# Patient Record
Sex: FEMALE | Race: BLACK OR AFRICAN AMERICAN | Employment: FULL TIME | URBAN - METROPOLITAN AREA
[De-identification: names, ages, dates, MRNs, and addresses within clinical notes are randomized per-mention and may not be internally consistent; named-entity substitution may affect disease eponyms.]

---

## 2019-02-06 NOTE — TELEPHONE ENCOUNTER
Refill was done in [facility] 08/14  But last seen in [facility] 08/16/2017

## 2019-02-07 NOTE — TELEPHONE ENCOUNTER
Tried to [facility] but phone number has been disconnected.

## 2019-02-08 RX ORDER — AMLODIPINE BESYLATE 5 MG/1
5 TABLET ORAL DAILY
Qty: 30 TAB | Refills: 5 | OUTPATIENT
Start: 2019-02-08 | End: 2019-03-10

## 2019-03-20 RX ORDER — LISINOPRIL 40 MG/1
40 TABLET ORAL DAILY
Qty: 30 TAB | Refills: 0 | OUTPATIENT
Start: 2019-03-20

## 2019-03-20 RX ORDER — AMLODIPINE BESYLATE 5 MG/1
5 TABLET ORAL DAILY
Qty: 30 TAB | Refills: 0 | OUTPATIENT
Start: 2019-03-20

## 2020-11-11 ENCOUNTER — TRANSCRIBE ORDER (OUTPATIENT)
Dept: SCHEDULING | Age: 50
End: 2020-11-11

## 2020-11-11 DIAGNOSIS — R00.2 PALPITATION: ICD-10-CM

## 2020-11-11 DIAGNOSIS — R07.9 CHEST PAIN: Primary | ICD-10-CM

## 2020-11-18 ENCOUNTER — TRANSCRIBE ORDER (OUTPATIENT)
Dept: SCHEDULING | Age: 50
End: 2020-11-18

## 2020-11-18 DIAGNOSIS — R07.9 CHEST PAIN: Primary | ICD-10-CM

## 2020-11-18 DIAGNOSIS — R00.2 PALPITATION: ICD-10-CM

## 2020-11-19 ENCOUNTER — APPOINTMENT (OUTPATIENT)
Dept: NON INVASIVE DIAGNOSTICS | Age: 50
End: 2020-11-19
Attending: INTERNAL MEDICINE
Payer: SUBSIDIZED

## 2020-11-19 ENCOUNTER — HOSPITAL ENCOUNTER (OUTPATIENT)
Dept: NON INVASIVE DIAGNOSTICS | Age: 50
Discharge: HOME | End: 2020-11-19
Attending: INTERNAL MEDICINE
Payer: SUBSIDIZED

## 2020-11-19 ENCOUNTER — APPOINTMENT (OUTPATIENT)
Dept: NUCLEAR MEDICINE | Age: 50
End: 2020-11-19
Attending: INTERNAL MEDICINE
Payer: SUBSIDIZED

## 2020-11-19 VITALS
SYSTOLIC BLOOD PRESSURE: 137 MMHG | HEIGHT: 66 IN | DIASTOLIC BLOOD PRESSURE: 88 MMHG | BODY MASS INDEX: 32.78 KG/M2 | WEIGHT: 204 LBS

## 2020-11-19 DIAGNOSIS — R07.9 CHEST PAIN: ICD-10-CM

## 2020-11-19 DIAGNOSIS — R00.2 PALPITATION: ICD-10-CM

## 2020-11-19 LAB
ECHO AO ROOT DIAM: 2.02 CM
ECHO AV AREA PLAN: 2.96 CM2
ECHO LA AREA 4C: 18.43 CM2
ECHO LA MAJOR AXIS: 3.08 CM
ECHO LA VOL 4C: 42.88 ML (ref 22–52)
ECHO LV EDV A4C: 88.97 ML
ECHO LV EJECTION FRACTION A4C: 67 PERCENT
ECHO LV ESV A4C: 29.42 ML
ECHO LV INTERNAL DIMENSION DIASTOLIC: 5.16 CM (ref 3.9–5.3)
ECHO LV INTERNAL DIMENSION SYSTOLIC: 2.94 CM
ECHO LV IVSD: 0.69 CM (ref 0.6–0.9)
ECHO LV MASS 2D: 129.9 G (ref 67–162)
ECHO LV POSTERIOR WALL DIASTOLIC: 0.79 CM (ref 0.6–0.9)
ECHO LVOT DIAM: 2.05 CM
ECHO LVOT PEAK GRADIENT: 3.73 MMHG
ECHO LVOT PEAK VELOCITY: 96.55 CM/S
ECHO MV A VELOCITY: 41.27 CM/S
ECHO MV AREA PHT: 6.19 CM2
ECHO MV AREA PLAN: 5.52 CM2
ECHO MV E DECELERATION TIME (DT): 122.47 MS
ECHO MV E VELOCITY: 37.32 CM/S
ECHO MV E/A RATIO: 0.9
ECHO MV PRESSURE HALF TIME (PHT): 35.52 MS
ECHO PV PEAK INSTANTANEOUS GRADIENT SYSTOLIC: 3.7 MMHG
ECHO PV REGURGITANT MAX VELOCITY: 96.12 CM/S
ECHO RA AREA 4C: 14.95 CM2
ECHO TV REGURGITANT MAX VELOCITY: 123.99 CM/S
STRESS ANGINA INDEX: 0
STRESS BASELINE DIAS BP: 88 MMHG
STRESS BASELINE HR: 83 BPM
STRESS BASELINE SYS BP: 137 MMHG
STRESS ESTIMATED WORKLOAD: 7 METS
STRESS EXERCISE DUR MIN: NORMAL
STRESS O2 SAT REST: 100 %
STRESS PEAK DIAS BP: 97 MMHG
STRESS PEAK SYS BP: 218 MMHG
STRESS PERCENT HR ACHIEVED: 97 %
STRESS POST PEAK HR: 164 BPM
STRESS RATE PRESSURE PRODUCT: NORMAL BPM*MMHG
STRESS SR DUKE TREADMILL SCORE: 0
STRESS ST DEPRESSION: 0 MM
STRESS ST ELEVATION: 0 MM
STRESS STAGE 1 BP: NORMAL MMHG
STRESS STAGE 1 COMMENTS: NORMAL
STRESS STAGE 1 DURATION: NORMAL MIN:SEC
STRESS STAGE 1 HR: 94 BPM
STRESS STAGE 2 BP: NORMAL MMHG
STRESS STAGE 2 COMMENTS: NORMAL
STRESS STAGE 2 DURATION: NORMAL MIN:SEC
STRESS STAGE 2 HR: 131 BPM
STRESS STAGE 3 COMMENTS: NORMAL
STRESS STAGE 3 DURATION: NORMAL MIN:SEC
STRESS STAGE 3 HR: 118 BPM
STRESS STAGE 4 COMMENTS: NORMAL
STRESS STAGE 4 DURATION: NORMAL MIN:SEC
STRESS STAGE 4 HR: 100 BPM
STRESS STAGE 5 BP: NORMAL MMHG
STRESS STAGE 5 COMMENTS: NORMAL
STRESS STAGE 5 DURATION: NORMAL MIN:SEC
STRESS STAGE 5 HR: 106 BPM
STRESS TARGET HR: 169 BPM

## 2020-11-19 PROCEDURE — 93271 ECG/MONITORING AND ANALYSIS: CPT

## 2020-11-19 PROCEDURE — 93306 TTE W/DOPPLER COMPLETE: CPT

## 2020-11-19 PROCEDURE — 93017 CV STRESS TEST TRACING ONLY: CPT

## 2020-11-19 NOTE — PROGRESS NOTES
14 day event monitor  placed after instructions to the pt, noted to return to [employer] per ups after 14 days, if life threatening  Event occurs call 911  ,

## 2022-03-29 ENCOUNTER — OFFICE VISIT (OUTPATIENT)
Dept: PRIMARY CARE CLINIC | Age: 52
End: 2022-03-29
Payer: COMMERCIAL

## 2022-03-29 ENCOUNTER — TELEPHONE (OUTPATIENT)
Dept: PRIMARY CARE CLINIC | Age: 52
End: 2022-03-29

## 2022-03-29 VITALS
HEIGHT: 66 IN | BODY MASS INDEX: 33.68 KG/M2 | DIASTOLIC BLOOD PRESSURE: 82 MMHG | SYSTOLIC BLOOD PRESSURE: 133 MMHG | HEART RATE: 81 BPM | TEMPERATURE: 98.7 F | RESPIRATION RATE: 16 BRPM | OXYGEN SATURATION: 99 % | WEIGHT: 209.6 LBS

## 2022-03-29 DIAGNOSIS — Z12.4 ENCOUNTER FOR SCREENING FOR CERVICAL CANCER: Primary | ICD-10-CM

## 2022-03-29 DIAGNOSIS — M54.2 NECK PAIN: ICD-10-CM

## 2022-03-29 DIAGNOSIS — I10 PRIMARY HYPERTENSION: ICD-10-CM

## 2022-03-29 DIAGNOSIS — Z12.31 ENCOUNTER FOR SCREENING MAMMOGRAM FOR MALIGNANT NEOPLASM OF BREAST: ICD-10-CM

## 2022-03-29 DIAGNOSIS — R07.89 CHEST DISCOMFORT: ICD-10-CM

## 2022-03-29 DIAGNOSIS — Z12.11 SCREEN FOR COLON CANCER: ICD-10-CM

## 2022-03-29 DIAGNOSIS — Z23 ENCOUNTER FOR IMMUNIZATION: ICD-10-CM

## 2022-03-29 PROCEDURE — 99205 OFFICE O/P NEW HI 60 MIN: CPT

## 2022-03-29 PROCEDURE — 90471 IMMUNIZATION ADMIN: CPT

## 2022-03-29 PROCEDURE — 90750 HZV VACC RECOMBINANT IM: CPT

## 2022-03-29 RX ORDER — ASPIRIN 81 MG/1
81 TABLET ORAL DAILY
COMMUNITY

## 2022-03-29 RX ORDER — LISINOPRIL 40 MG/1
40 TABLET ORAL DAILY
Qty: 30 TABLET | Refills: 0 | Status: SHIPPED | OUTPATIENT
Start: 2022-03-29 | End: 2022-04-28

## 2022-03-29 RX ORDER — KETOROLAC TROMETHAMINE 10 MG/1
10 TABLET, FILM COATED ORAL
Qty: 16 TABLET | Refills: 0 | Status: SHIPPED | OUTPATIENT
Start: 2022-03-29 | End: 2022-04-02

## 2022-03-29 RX ORDER — AMLODIPINE BESYLATE 5 MG/1
5 TABLET ORAL DAILY
Qty: 30 TABLET | Refills: 0 | Status: SHIPPED | OUTPATIENT
Start: 2022-03-29 | End: 2022-04-23

## 2022-03-29 NOTE — LETTER
NOTIFICATION RETURN TO WORK / SCHOOL    3/29/2022 12:12 PM    Ms. [address] [place] [address]      To Whom It May Concern:    Marcio Snowden is currently under the care of Shubham Cueva. She will return to work/school on: 4/4/2022    If there are questions or concerns please have the patient contact our office.         Sincerely,      Truman Lemon NP

## 2022-03-29 NOTE — PROGRESS NOTES
Chief Complaint   Patient presents with    Establish Care     establish care, routine labs    Medication Refill     amlodipine, lisinopril     Visit Vitals  /82 (BP 1 Location: Left upper arm, BP Patient Position: Sitting, BP Cuff Size: Adult)   Pulse 81   Temp 98.7 °F (37.1 °C) (Oral)   Resp 16   Ht 5' 6\" (1.676 m)   Wt 209 lb 9.6 oz (95.1 kg)   SpO2 99%   BMI 33.83 kg/m²   1. \"Have you been to the ER, urgent care clinic since your last visit? Hospitalized since your last visit? \"  no    2. \"Have you seen or consulted any other health care providers outside of the [address] [place] [address] since your last visit? \"  no    3. For patients aged 39-70: Has the patient had a colonoscopy / FIT/ Cologuard?  no      If the patient is female:    4. For patients aged 41-77: Has the patient had a mammogram within the past 2 years?  no      5. For patients aged 21-65: Has the patient had a pap smear?   no

## 2022-03-29 NOTE — PATIENT INSTRUCTIONS
Stopping Smoking: Care Instructions  Your Care Instructions     Cigarette smokers crave the nicotine in cigarettes. Giving it up is much harder than simply changing a habit. Your body has to stop craving the nicotine. It is hard to quit, but you can do it. There are many tools that people use to quit smoking. You may find that combining tools works best for you. There are several steps to quitting. First you get ready to quit. Then you get support to help you. After that, you learn new skills and behaviors to become a nonsmoker. For many people, a necessary step is getting and using medicine. Your doctor will help you set up the plan that best meets your needs. You may want to attend a smoking cessation program to help you quit smoking. When you choose a program, look for one that has proven success. Ask your doctor for ideas. You will greatly increase your chances of success if you take medicine as well as get counseling or join a cessation program.  Some of the changes you feel when you first quit tobacco are uncomfortable. Your body will miss the nicotine at first, and you may feel short-tempered and grumpy. You may have trouble sleeping or concentrating. Medicine can help you deal with these symptoms. You may struggle with changing your smoking habits and rituals. The last step is the tricky one: Be prepared for the smoking urge to continue for a time. This is a lot to deal with, but keep at it. You will feel better. Follow-up care is a key part of your treatment and safety. Be sure to make and go to all appointments, and call your doctor if you are having problems. It's also a good idea to know your test results and keep a list of the medicines you take. How can you care for yourself at home? · Ask your family, friends, and coworkers for support. You have a better chance of quitting if you have help and support.   · Join a support group, such as Nicotine Anonymous, for people who are trying to quit smoking. · Consider signing up for a smoking cessation program, such as the American Lung Association's Freedom from Smoking program.  · Get text messaging support. Go to the website at www.smokefree. gov to sign up for the [facility] program.  · Set a quit date. Pick your date carefully so that it is not right in the middle of a big deadline or stressful time. Once you quit, do not even take a puff. Get rid of all ashtrays and lighters after your last cigarette. Clean your house and your clothes so that they do not smell of smoke. · Learn how to be a nonsmoker. Think about ways you can avoid those things that make you reach for a cigarette. ? Avoid situations that put you at greatest risk for smoking. For some people, it is hard to have a drink with friends without smoking. For others, they might skip a coffee break with coworkers who smoke. ? Change your daily routine. Take a different route to work or eat a meal in a different place. · Cut down on stress. Calm yourself or release tension by doing an activity you enjoy, such as reading a book, taking a hot bath, or gardening. · Talk to your doctor or pharmacist about nicotine replacement therapy, which replaces the nicotine in your body. You still get nicotine but you do not use tobacco. Nicotine replacement products help you slowly reduce the amount of nicotine you need. These products come in several forms, many of them available over-the-counter:  ? Nicotine patches  ? Nicotine gum and lozenges  ? Nicotine inhaler  · Ask your doctor about bupropion (Wellbutrin) or varenicline (Chantix), which are prescription medicines. They do not contain nicotine. They help you by reducing withdrawal symptoms, such as stress and anxiety. · Some people find hypnosis, acupuncture, and massage helpful for ending the smoking habit. · Eat a healthy diet and get regular exercise. Having healthy habits will help your body move past its craving for nicotine.   · Be prepared to keep trying. Most people are not successful the first few times they try to quit. Do not get mad at yourself if you smoke again. Make a list of things you learned and think about when you want to try again, such as next week, next month, or next year. Where can you learn more? Go to http://www.gray.com/  Enter [id number] in the search box to learn more about \"Stopping Smoking: Care Instructions. \"  Current as of: October 28, 2021               Content Version: 13.2  © 9979-7374 [employer]wise, [employer]. Care instructions adapted under license by [employer] (which disclaims liability or warranty for this information). If you have questions about a medical condition or this instruction, always ask your healthcare professional. [address] any warranty or liability for your use of this information.

## 2022-03-29 NOTE — PROGRESS NOTES
HPI     Chief Complaint   Patient presents with    Establish Care     establish care, routine labs    Medication Refill     amlodipine, lisinopril        HPI:  Nic Tyson is a 47 y.o. female who presents to the office today to establish care with a new provider. Neck pain: Patient complaining of neck pain left side radiates down to the scapula. Denies trauma, states worse when she is in a supine position. No history of trauma. No change in mobility of left arm. Chest discomfort: Patient endorses a 2-month period of dull chest pressure substernal without radiation. States that discomfort goes away with time and is more noticeable when lying flat. Negative diaphoresis, negative dyspnea, negative nausea vomiting. Patient has not been evaluated for the same. Hypertension: Patients hypertension is well controlled on regimen of amlodipine and lisinopril. Denies headaches, blurred vision or dizziness. No Known Allergies    Current Outpatient Medications   Medication Sig    aspirin delayed-release 81 mg tablet Take 81 mg by mouth daily.  amLODIPine (NORVASC) 5 mg tablet Take 1 Tablet by mouth daily.  lisinopriL (PRINIVIL, ZESTRIL) 40 mg tablet Take 1 Tablet by mouth daily.  ketorolac (TORADOL) 10 mg tablet Take 1 Tablet by mouth every six (6) hours as needed for Pain for up to 4 days. No current facility-administered medications for this visit. Review of Systems   Constitutional: Negative for malaise/fatigue and weight loss. Eyes: Negative for blurred vision and double vision. Respiratory: Negative for cough and shortness of breath. Cardiovascular: Positive for chest pain (Substernal chest pressure noted over 2 months. Not present today. ). Negative for palpitations, orthopnea, claudication and leg swelling. Gastrointestinal: Negative for heartburn and nausea. Musculoskeletal: Negative for joint pain and myalgias. Skin: Negative for itching and rash. Neurological: Negative for dizziness, tingling, loss of consciousness, weakness and headaches. Endo/Heme/Allergies: Does not bruise/bleed easily. Psychiatric/Behavioral: Negative for depression. The patient is not nervous/anxious. All other systems reviewed and are negative. Reviewed PmHx, FmHx, SocHx as well as meds and allergies, updated and dated in the chart. Objective     Visit Vitals  /82 (BP 1 Location: Left upper arm, BP Patient Position: Sitting, BP Cuff Size: Adult)   Pulse 81   Temp 98.7 °F (37.1 °C) (Oral)   Resp 16   Ht 5' 6\" (1.676 m)   Wt 209 lb 9.6 oz (95.1 kg)   SpO2 99%   BMI 33.83 kg/m²     Physical Exam  Vitals and nursing note reviewed. Constitutional:       Appearance: Normal appearance. She is normal weight. HENT:      Head: Normocephalic. Eyes:      Extraocular Movements: Extraocular movements intact. Conjunctiva/sclera: Conjunctivae normal.      Pupils: Pupils are equal, round, and reactive to light. Cardiovascular:      Rate and Rhythm: Normal rate and regular rhythm. Pulses: Normal pulses. Heart sounds: Normal heart sounds. Pulmonary:      Effort: Pulmonary effort is normal.      Breath sounds: Normal breath sounds. Musculoskeletal:         General: Normal range of motion. Cervical back: Normal range of motion and neck supple. Skin:     General: Skin is warm and dry. Neurological:      General: No focal deficit present. Mental Status: She is alert and oriented to person, place, and time. Psychiatric:         Mood and Affect: Mood normal.             Assessment and Plan     Diagnoses and all orders for this visit:    1. Encounter for screening for cervical cancer  Assessment & Plan:   asymptomatic, changes made today: Zoster vac administered in office today.     Orders:  -     REFERRAL TO GYNECOLOGY    2. Screen for colon cancer  Assessment & Plan:   asymptomatic, changes made today: Zoster vac administered in office today.    Orders:  -     COLOGUARD TEST (FECAL DNA COLORECTAL CANCER SCREENING)    3. Encounter for immunization  Assessment & Plan:   asymptomatic, changes made today: Zoster vac administered in office today. Orders:  -     ZOSTER VACC RECOMBINANT ADJUVANTED    4. Primary hypertension  Assessment & Plan:   well controlled, continue current medications, continue current treatment plan    Orders:  -     amLODIPine (NORVASC) 5 mg tablet; Take 1 Tablet by mouth daily. -     lisinopriL (PRINIVIL, ZESTRIL) 40 mg tablet; Take 1 Tablet by mouth daily. 5. Chest discomfort  Assessment & Plan:   unclear control, changes made today: Twelve-lead EKG performed. Orders:  -     EKG, 12 LEAD, INITIAL; Future  Resulted:  EKG NSR    6. Encounter for screening mammogram for malignant neoplasm of breast  Assessment & Plan:   asymptomatic, changes made today: Zoster vac administered in office today. Orders:  -     REFERRAL TO GYNECOLOGY    7. Neck pain  Assessment & Plan:   borderline controlled, continue current medications patient to discontinue aspirin while taking Toradol. Plan includes to follow-up in 2 months. Patient instructed to call back in 1 week if pain has not improved. Orders:  -     ketorolac (TORADOL) 10 mg tablet; Take 1 Tablet by mouth every six (6) hours as needed for Pain for up to 4 days. Medication Side Effects and Warnings were discussed with patient. Patient Labs were reviewed and or requested. Patient Past Records were reviewed and or requested. Follow-up and Dispositions    · Return in about 2 months (around 5/29/2022) for fasting labs, follow up, Lipids. On this date 3/29/2022 I have spent 60 minutes reviewing previous notes, test results and face to face with the patient discussing the diagnosis and plan of care as well as documenting on the day of the visit. Please note that this dictation was completed with [email or web address], the [employer] voice recognition software.   Quite often unanticipated grammatical, syntax, homophones, and other interpretive errors are inadvertently transcribed by the computer software. Please disregard these errors. Please excuse any errors that have escaped final proofreading. Thank you. I have discussed the diagnosis with the patient and the intended plan as seen in the above orders. The patient has received an after-visit summary and questions were answered concerning future plans. I have discussed medication side effects and warnings with the patient as well. Roel Ruiz, [facility]  [address]

## 2022-03-29 NOTE — ASSESSMENT & PLAN NOTE
borderline controlled, continue current medications patient to discontinue aspirin while taking Toradol. Plan includes to follow-up in 2 months. Patient instructed to call back in 1 week if pain has not improved.

## 2022-03-30 ENCOUNTER — TELEPHONE (OUTPATIENT)
Dept: PRIMARY CARE CLINIC | Age: 52
End: 2022-03-30

## 2022-03-30 DIAGNOSIS — R07.89 CHEST DISCOMFORT: ICD-10-CM

## 2022-03-30 PROCEDURE — 93010 ELECTROCARDIOGRAM REPORT: CPT

## 2022-03-30 NOTE — TELEPHONE ENCOUNTER
Spoke with Pt. And advised her to take the 5mg every day and monitor her BP for 14 days. Call the office to schedule appt. If BP is out of range. Pt. Voiced understanding.

## 2022-03-30 NOTE — TELEPHONE ENCOUNTER
Patient stated that her amlodipine was only prescribed for 5 mg and she takes 10 mg daily. Could you please update this prescription?

## 2022-04-20 DIAGNOSIS — I10 PRIMARY HYPERTENSION: ICD-10-CM

## 2022-04-23 RX ORDER — AMLODIPINE BESYLATE 5 MG/1
TABLET ORAL
Qty: 30 TABLET | Refills: 0 | Status: SHIPPED | OUTPATIENT
Start: 2022-04-23 | End: 2022-04-28

## 2022-04-26 DIAGNOSIS — I10 PRIMARY HYPERTENSION: ICD-10-CM

## 2022-04-28 RX ORDER — LISINOPRIL 40 MG/1
TABLET ORAL
Qty: 30 TABLET | Refills: 1 | Status: SHIPPED | OUTPATIENT
Start: 2022-04-28 | End: 2022-06-26

## 2022-04-28 RX ORDER — AMLODIPINE BESYLATE 5 MG/1
TABLET ORAL
Qty: 30 TABLET | Refills: 1 | Status: SHIPPED | OUTPATIENT
Start: 2022-04-28 | End: 2022-05-31

## 2022-05-31 ENCOUNTER — OFFICE VISIT (OUTPATIENT)
Dept: PRIMARY CARE CLINIC | Age: 52
End: 2022-05-31
Payer: COMMERCIAL

## 2022-05-31 VITALS
TEMPERATURE: 98.3 F | SYSTOLIC BLOOD PRESSURE: 120 MMHG | HEART RATE: 82 BPM | WEIGHT: 205.2 LBS | HEIGHT: 66 IN | OXYGEN SATURATION: 97 % | BODY MASS INDEX: 32.98 KG/M2 | RESPIRATION RATE: 16 BRPM | DIASTOLIC BLOOD PRESSURE: 74 MMHG

## 2022-05-31 DIAGNOSIS — Z00.00 ROUTINE PHYSICAL EXAMINATION: ICD-10-CM

## 2022-05-31 DIAGNOSIS — E78.2 MIXED HYPERLIPIDEMIA: ICD-10-CM

## 2022-05-31 DIAGNOSIS — R07.89 CHEST DISCOMFORT: Primary | ICD-10-CM

## 2022-05-31 DIAGNOSIS — I10 PRIMARY HYPERTENSION: ICD-10-CM

## 2022-05-31 PROBLEM — Z13.220 ENCOUNTER FOR LIPID SCREENING FOR CARDIOVASCULAR DISEASE: Status: ACTIVE | Noted: 2022-05-31

## 2022-05-31 PROBLEM — Z13.220 ENCOUNTER FOR LIPID SCREENING FOR CARDIOVASCULAR DISEASE: Status: RESOLVED | Noted: 2022-05-31 | Resolved: 2022-05-31

## 2022-05-31 PROBLEM — Z13.6 ENCOUNTER FOR LIPID SCREENING FOR CARDIOVASCULAR DISEASE: Status: RESOLVED | Noted: 2022-05-31 | Resolved: 2022-05-31

## 2022-05-31 PROBLEM — Z13.6 ENCOUNTER FOR LIPID SCREENING FOR CARDIOVASCULAR DISEASE: Status: ACTIVE | Noted: 2022-05-31

## 2022-05-31 PROCEDURE — 99215 OFFICE O/P EST HI 40 MIN: CPT

## 2022-05-31 PROCEDURE — 93000 ELECTROCARDIOGRAM COMPLETE: CPT

## 2022-05-31 RX ORDER — AMLODIPINE BESYLATE 10 MG/1
10 TABLET ORAL DAILY
Qty: 90 TABLET | Refills: 2 | Status: SHIPPED | OUTPATIENT
Start: 2022-05-31

## 2022-05-31 NOTE — PATIENT INSTRUCTIONS
Learning About Diuretics for High Blood Pressure  Overview  Diuretics help to lower blood pressure. This reduces your risk of a heart attack and stroke. It also reduces your risk of kidney disease. Diuretics cause your kidneys to remove sodium and water. They also relax the blood vessel walls. These help lower your blood pressure. Examples  · Chlorthalidone  · Hydrochlorothiazide  Possible side effects  There are some common side effects. They are:  · Too little potassium. · Feeling dizzy. · Rash. · Urinating a lot. · High blood sugar. (But this is not common.)  You may have other side effects. Check the information that comes with your medicine. What to know about taking this medicine  · You may take other medicines for blood pressure. Diuretics can help those work better. They can also prevent extra fluid in your body. · You may need to take potassium pills. Ask your doctor about this. · You may need blood tests to check on your health. For example, you may have tests to check your kidneys and your potassium level. · Take your medicines exactly as prescribed. Call your doctor if you think you are having a problem with your medicine. · Check with your doctor or pharmacist before you use any other medicines. This includes over-the-counter medicines. Make sure your doctor knows all of the medicines, vitamins, herbal products, and supplements you take. Taking some medicines together can cause problems. Where can you learn more? Go to http://jaciel-ishan.info/  Enter A406 in the search box to learn more about \"Learning About Diuretics for High Blood Pressure. \"  Current as of: January 10, 2022               Content Version: 13.2  © 1043-5060 [employer]. Care instructions adapted under license by [employer] (which disclaims liability or warranty for this information).  If you have questions about a medical condition or this instruction, always ask your healthcare professional. [address] any warranty or liability for your use of this information.

## 2022-05-31 NOTE — ASSESSMENT & PLAN NOTE
uncontrolled, changes made today: POC twelve-lead performed today in office. Cardiology consult placed. Comparison of twelve-lead from 3/29/2022 versus today no change no ST segment elevation or depression and T wave abnormality.

## 2022-05-31 NOTE — PROGRESS NOTES
Chief Complaint   Patient presents with   [facility]    Follow-up     2 month f/u. discuss dosage on norvasc     Visit Vitals  /74 (BP 1 Location: Left upper arm, BP Patient Position: Sitting, BP Cuff Size: Adult)   Pulse 82   Temp 98.3 °F (36.8 °C) (Oral)   Resp 16   Ht 5' 6\" (1.676 m)   Wt 205 lb 3.2 oz (93.1 kg)   SpO2 97%   BMI 33.12 kg/m²     1. \"Have you been to the ER, urgent care clinic since your last visit? Hospitalized since your last visit? \" No    2. \"Have you seen or consulted any other health care providers outside of the [address] [place] [address] since your last visit? \" No     3. For patients aged 39-70: Has the patient had a colonoscopy / FIT/ Cologuard? Yes - no Care Gap present      If the patient is female:    4. For patients aged 41-77: Has the patient had a mammogram within the past 2 years? No      5. For patients aged 21-65: Has the patient had a pap smear?  No

## 2022-05-31 NOTE — PROGRESS NOTES
HPI     Chief Complaint   Patient presents with    Labs    Follow-up     2 month f/u. discuss dosage on norvasc        HPI:  Cuco Fisher is a 47 y.o. female who is here to follow up from a previous visit. Hypertension: Patients hypertension is well controlled on regimen of Norvasc. Although patient was prescribed for 5mg, she has always been on 10mg and has been doubling the dose since the initial 5mg rx. Denies headaches, blurred vision or dizziness. Chest discomfort: Patient states she has a 2-month history of intermittent chest pain. Unable to determine if it is increased with exertion. Had a similar complaint on 3/29/2022. No shortness of breath or diaphoresis noted. Extensive familial history to include MI and stents on mother side of her family. Denies syncopal episodes. No Known Allergies    Current Outpatient Medications   Medication Sig    amLODIPine (NORVASC) 10 mg tablet Take 1 Tablet by mouth daily.  lisinopriL (PRINIVIL, ZESTRIL) 40 mg tablet Take 1 tablet by mouth once daily    aspirin delayed-release 81 mg tablet Take 81 mg by mouth daily. No current facility-administered medications for this visit. Review of Systems   Constitutional: Negative for malaise/fatigue and weight loss. Eyes: Negative for blurred vision and double vision. Respiratory: Negative for cough and shortness of breath. Cardiovascular: Positive for chest pain (Substernal chest pressure noted over 2 months. Not present today. ). Negative for palpitations, orthopnea, claudication and leg swelling. Gastrointestinal: Negative for heartburn and nausea. Musculoskeletal: Negative for joint pain and myalgias. Skin: Negative for itching and rash. Neurological: Negative for dizziness, tingling, loss of consciousness, weakness and headaches. Endo/Heme/Allergies: Does not bruise/bleed easily. Psychiatric/Behavioral: Negative for depression.  The patient is not nervous/anxious. All other systems reviewed and are negative. Reviewed PmHx, FmHx, SocHx as well as meds and allergies, updated and dated in the chart. Objective     Visit Vitals  /74 (BP 1 Location: Left upper arm, BP Patient Position: Sitting, BP Cuff Size: Adult)   Pulse 82   Temp 98.3 °F (36.8 °C) (Oral)   Resp 16   Ht 5' 6\" (1.676 m)   Wt 205 lb 3.2 oz (93.1 kg)   SpO2 97%   BMI 33.12 kg/m²     Physical Exam  Vitals and nursing note reviewed. Constitutional:       Appearance: Normal appearance. She is normal weight. HENT:      Head: Normocephalic. Eyes:      Extraocular Movements: Extraocular movements intact. Conjunctiva/sclera: Conjunctivae normal.      Pupils: Pupils are equal, round, and reactive to light. Cardiovascular:      Rate and Rhythm: Normal rate and regular rhythm. Pulses: Normal pulses. Heart sounds: Normal heart sounds. Pulmonary:      Effort: Pulmonary effort is normal.      Breath sounds: Normal breath sounds. Musculoskeletal:         General: Normal range of motion. Cervical back: Normal range of motion and neck supple. Skin:     General: Skin is warm and dry. Neurological:      General: No focal deficit present. Mental Status: She is alert and oriented to person, place, and time. Psychiatric:         Mood and Affect: Mood normal.             Assessment and Plan     Diagnoses and all orders for this visit:    1. Chest discomfort  Assessment & Plan:   uncontrolled, changes made today: POC twelve-lead performed today in office. Cardiology consult placed. Comparison of twelve-lead from 3/29/2022 versus today no change no ST segment elevation or depression and T wave abnormality. Orders:  -     REFERRAL TO CARDIOLOGY  -     AMB POC EKG ROUTINE W/ 12 LEADS, INTER & REP    2.  Routine physical examination  Assessment & Plan:   unclear control, continue current plan pending work up below    Orders:  -     CBC WITH AUTOMATED DIFF  - METABOLIC PANEL, COMPREHENSIVE  -     AMB POC EKG ROUTINE W/ 12 LEADS, INTER & REP    3. Mixed hyperlipidemia  Assessment & Plan:   unclear control, continue current plan pending work up below    Orders:  -     LIPID PANEL    4. Primary hypertension  Assessment & Plan:   well controlled, continue current medications, medication adherence emphasized    Orders:  -     amLODIPine (NORVASC) 10 mg tablet; Take 1 Tablet by mouth daily. Medication Side Effects and Warnings were discussed with patient. Patient Labs were reviewed and or requested. Patient Past Records were reviewed and or requested. On this date 5/31/2022 I have spent 40 minutes reviewing previous notes, test results and face to face with the patient discussing the diagnosis and plan of care as well as documenting on the day of the visit. Please note that this dictation was completed with [employer] the [employer] voice recognition software. Quite often unanticipated grammatical, syntax, homophones, and other interpretive errors are inadvertently transcribed by the computer software. Please disregard these errors. Please excuse any errors that have escaped final proofreading. I have discussed the diagnosis with the patient and the intended plan as seen in the above orders. The patient has received an after-visit summary and questions were answered concerning future plans. I have discussed medication side effects and warnings with the patient as well. Roel Dunn, [address] [facility]  [address]

## 2022-06-04 LAB
ALBUMIN SERPL-MCNC: 4.3 G/DL (ref 3.8–4.9)
ALBUMIN/GLOB SERPL: 1.3 {RATIO} (ref 1.2–2.2)
ALP SERPL-CCNC: 66 IU/L (ref 44–121)
ALT SERPL-CCNC: 13 IU/L (ref 0–32)
AST SERPL-CCNC: 14 IU/L (ref 0–40)
BASOPHILS # BLD AUTO: 0.1 X10E3/UL (ref 0–0.2)
BASOPHILS NFR BLD AUTO: 1 %
BILIRUB SERPL-MCNC: 0.2 MG/DL (ref 0–1.2)
BUN SERPL-MCNC: 7 MG/DL (ref 6–24)
BUN/CREAT SERPL: 11 (ref 9–23)
CALCIUM SERPL-MCNC: 9.9 MG/DL (ref 8.7–10.2)
CHLORIDE SERPL-SCNC: 105 MMOL/L (ref 96–106)
CHOLEST SERPL-MCNC: 215 MG/DL (ref 100–199)
CO2 SERPL-SCNC: 23 MMOL/L (ref 20–29)
CREAT SERPL-MCNC: 0.62 MG/DL (ref 0.57–1)
EGFR: 106 ML/MIN/1.73
EOSINOPHIL # BLD AUTO: 0.3 X10E3/UL (ref 0–0.4)
EOSINOPHIL NFR BLD AUTO: 5 %
ERYTHROCYTE [DISTWIDTH] IN BLOOD BY AUTOMATED COUNT: 14 % (ref 11.7–15.4)
GLOBULIN SER CALC-MCNC: 3.3 G/DL (ref 1.5–4.5)
GLUCOSE SERPL-MCNC: 90 MG/DL (ref 65–99)
HCT VFR BLD AUTO: 35.5 % (ref 34–46.6)
HDLC SERPL-MCNC: 48 MG/DL
HGB BLD-MCNC: 11.6 G/DL (ref 11.1–15.9)
IMM GRANULOCYTES # BLD AUTO: 0 X10E3/UL (ref 0–0.1)
IMM GRANULOCYTES NFR BLD AUTO: 0 %
LDLC SERPL CALC-MCNC: 137 MG/DL (ref 0–99)
LYMPHOCYTES # BLD AUTO: 2.1 X10E3/UL (ref 0.7–3.1)
LYMPHOCYTES NFR BLD AUTO: 41 %
MCH RBC QN AUTO: 26.1 PG (ref 26.6–33)
MCHC RBC AUTO-ENTMCNC: 32.7 G/DL (ref 31.5–35.7)
MCV RBC AUTO: 80 FL (ref 79–97)
MONOCYTES # BLD AUTO: 0.5 X10E3/UL (ref 0.1–0.9)
MONOCYTES NFR BLD AUTO: 10 %
NEUTROPHILS # BLD AUTO: 2.2 X10E3/UL (ref 1.4–7)
NEUTROPHILS NFR BLD AUTO: 43 %
PLATELET # BLD AUTO: 284 X10E3/UL (ref 150–450)
POTASSIUM SERPL-SCNC: 4.1 MMOL/L (ref 3.5–5.2)
PROT SERPL-MCNC: 7.6 G/DL (ref 6–8.5)
RBC # BLD AUTO: 4.45 X10E6/UL (ref 3.77–5.28)
SODIUM SERPL-SCNC: 142 MMOL/L (ref 134–144)
TRIGL SERPL-MCNC: 165 MG/DL (ref 0–149)
VLDLC SERPL CALC-MCNC: 30 MG/DL (ref 5–40)
WBC # BLD AUTO: 5.1 X10E3/UL (ref 3.4–10.8)

## 2022-06-15 NOTE — PROGRESS NOTES
Please let patient know that all her test results are normal except for high cholesterol. I would recommend avoiding fatty fried foods, more vegetables and fruits, low-fat dairy, limit red meat, exercising every day for 30 minutes if possible.

## 2022-06-30 PROBLEM — Z00.00 ROUTINE PHYSICAL EXAMINATION: Status: RESOLVED | Noted: 2022-05-31 | Resolved: 2022-06-30

## 2022-07-27 ENCOUNTER — OFFICE VISIT (OUTPATIENT)
Dept: CARDIOLOGY CLINIC | Age: 52
End: 2022-07-27
Payer: COMMERCIAL

## 2022-07-27 VITALS
SYSTOLIC BLOOD PRESSURE: 108 MMHG | HEART RATE: 63 BPM | BODY MASS INDEX: 32.3 KG/M2 | RESPIRATION RATE: 16 BRPM | WEIGHT: 201 LBS | DIASTOLIC BLOOD PRESSURE: 70 MMHG | OXYGEN SATURATION: 98 % | HEIGHT: 66 IN

## 2022-07-27 DIAGNOSIS — R07.89 OTHER CHEST PAIN: Primary | ICD-10-CM

## 2022-07-27 DIAGNOSIS — Z82.49 FAMILY HISTORY OF PREMATURE CAD: ICD-10-CM

## 2022-07-27 DIAGNOSIS — I10 PRIMARY HYPERTENSION: ICD-10-CM

## 2022-07-27 PROCEDURE — 99214 OFFICE O/P EST MOD 30 MIN: CPT | Performed by: SPECIALIST

## 2022-07-27 RX ORDER — ROSUVASTATIN CALCIUM 10 MG/1
10 TABLET, COATED ORAL
Qty: 30 TABLET | Refills: 5 | Status: SHIPPED | OUTPATIENT
Start: 2022-07-27

## 2022-07-27 NOTE — PROGRESS NOTES
HISTORY OF PRESENT ILLNESS  Joseph Kim is a 47 y.o. female     SUMMARY:   Problem List  Date Reviewed: 7/27/2022            Codes Class Noted    Routine physical examination ICD-10-CM: Z00.00  ICD-9-CM: V70.0  5/31/2022        Mixed hyperlipidemia ICD-10-CM: E78.2  ICD-9-CM: 272.2  5/31/2022        Primary hypertension ICD-10-CM: I10  ICD-9-CM: 401.9  3/29/2022    Overview Signed 3/29/2022 12:31 PM by Jean Paul Chun NP     Patient previously followed by her primary care physician. Neck pain ICD-10-CM: M54.2  ICD-9-CM: 723.1  3/29/2022    Overview Signed 3/29/2022 12:30 PM by Jean Paul Chun NP     1 week history of neck and shoulder pain. Localized to the scapula. States she noticed it more at work recently. Increased when laying flat. Encounter for immunization ICD-10-CM: Z23  ICD-9-CM: V03.89  3/29/2022        Chest discomfort ICD-10-CM: R07.89  ICD-9-CM: 786.59  3/29/2022    Overview Addendum 5/31/2022 12:05 PM by Jean Paul Chun NP     Chest discomfort localized to the sternal area that has been intermittent for 2 months. Denies exertional dyspnea. No cardiac history. Pain does not radiate. Questionable increased based on positioning. Patient also has left scapular pain when laying supine which has only been going on for a week. Patient was previously diagnosed with a heart murmur by her previous primary care. No follow-up by cardiology. Current Outpatient Medications on File Prior to Visit   Medication Sig    lisinopriL (PRINIVIL, ZESTRIL) 40 mg tablet Take 1 tablet by mouth once daily    amLODIPine (NORVASC) 10 mg tablet Take 1 Tablet by mouth daily. aspirin delayed-release 81 mg tablet Take 81 mg by mouth daily. No current facility-administered medications on file prior to visit.        CARDIOLOGY STUDIES TO DATE:  11/20 normal exercise stress test  11/20 normal echo  12/20 event monitor, rare pvc, pac's      Chief Complaint   Patient presents with    New Patient     HPI :  She is referred by her primary care for cardiac evaluation. Couple months ago she began to notice some upper midsternal chest discomfort that she would usually feel in the evening after she got home from work showered and was relaxing. At times there was some associated soreness and other times it seemed to get better with Tums. It was not exertional or associated with any other symptoms. 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 She is a past smoker and has hypertension and a family history of premature coronary disease in her mother. No history of diabetes and recent LDL cholesterol was 137 with an HDL of 48. Her most recent EKG was completely normal.  2 years ago she was complaining of some chest pain and palpitations and had testing which I reviewed and summarized above. CARDIAC ROS:   negative for dyspnea, palpitations, syncope, orthopnea, paroxysmal nocturnal dyspnea, exertional chest pressure/discomfort, claudication, lower extremity edema    Family History   Problem Relation Age of Onset    Heart Disease Mother         stent early 52's       Past Medical History:   Diagnosis Date    Hypertension        GENERAL ROS:  A comprehensive review of systems was negative except for that written in the HPI.     Visit Vitals  /70   Pulse 63   Resp 16   Ht 5' 6\" (1.676 m)   Wt 201 lb (91.2 kg)   SpO2 98%   BMI 32.44 kg/m²       Wt Readings from Last 3 Encounters:   07/27/22 201 lb (91.2 kg)   05/31/22 205 lb 3.2 oz (93.1 kg)   03/29/22 209 lb 9.6 oz (95.1 kg)            BP Readings from Last 3 Encounters:   07/27/22 108/70   05/31/22 120/74   03/29/22 133/82       PHYSICAL EXAM  General appearance: alert, cooperative, no distress, appears stated age  Neurologic: Alert and oriented X 3  Neck: supple, symmetrical, trachea midline, no adenopathy, no carotid bruit, and no JVD  Lungs: clear to auscultation bilaterally  Heart: regular rate and rhythm, S1, S2 normal, no murmur, click, rub or gallop  Abdomen: soft, non-tender. Bowel sounds normal. No masses,  no organomegaly  Extremities: extremities normal, atraumatic, no cyanosis or edema  Pulses: 2+ and symmetric    Lab Results   Component Value Date/Time    Cholesterol, total 215 (H) 06/03/2022 10:07 AM    HDL Cholesterol 48 06/03/2022 10:07 AM    LDL, calculated 137 (H) 06/03/2022 10:07 AM    Triglyceride 165 (H) 06/03/2022 10:07 AM     ASSESSMENT :      Although she does have some important ongoing cardiac risk factors her symptoms are not compatible with a cardiac etiology so we talked about that at some length, as well as symptoms that would prompt an urgent return visit here or a call to 911. In the meantime I think we should treat her LDL cholesterol given in particular her family history and we will repeat an exercise treadmill test to make sure she is okay to continue to be active. I am going to start her on Crestor 10 mg a day and she will get follow-up blood work with her PCP  current treatment plan is effective, no change in therapy  lab results and schedule of future lab studies reviewed with patient  reviewed diet, exercise and weight control    Encounter Diagnoses   Name Primary? Other chest pain Yes    Primary hypertension     Family history of premature CAD      Orders Placed This Encounter    rosuvastatin (CRESTOR) 10 mg tablet         Follow-up and Dispositions    Return if symptoms worsen or fail to improve. Rashaun Morgan MD  7/27/2022  Please note that this dictation was completed with [employer], the computer voice recognition software. Quite often unanticipated grammatical, syntax, homophones, and other interpretive errors are inadvertently transcribed by the computer software. Please disregard these errors. Please excuse any errors that have escaped final proofreading. Thank you.

## 2022-08-23 DIAGNOSIS — I10 PRIMARY HYPERTENSION: ICD-10-CM

## 2022-08-24 RX ORDER — AMLODIPINE BESYLATE 5 MG/1
TABLET ORAL
Qty: 30 TABLET | Refills: 0 | OUTPATIENT
Start: 2022-08-24

## 2022-09-23 NOTE — TELEPHONE ENCOUNTER
Called pt to schedule appointment, she stated that she had enough medication to get her to December. Notified pt that all providers are starting to book out in a month advance and would highly recommend she schedule now, pt declined and stated she would call back next month to schedule.

## 2022-11-21 DIAGNOSIS — I10 PRIMARY HYPERTENSION: ICD-10-CM

## 2022-11-21 RX ORDER — LISINOPRIL 40 MG/1
TABLET ORAL
Qty: 30 TABLET | Refills: 0 | Status: SHIPPED | OUTPATIENT
Start: 2022-11-21

## 2022-11-21 NOTE — TELEPHONE ENCOUNTER
Refills sent but she needs to establish with a new PCP in the office since [address] left if you can please get her scheduled with someone.

## 2023-05-24 RX ORDER — ASPIRIN 81 MG/1
81 TABLET ORAL DAILY
COMMUNITY

## 2023-05-24 RX ORDER — AMLODIPINE BESYLATE 10 MG/1
10 TABLET ORAL DAILY
COMMUNITY
Start: 2023-02-23 | End: 2023-06-05

## 2023-05-24 RX ORDER — LISINOPRIL 40 MG/1
40 TABLET ORAL DAILY
COMMUNITY
Start: 2023-02-23 | End: 2023-06-05

## 2023-05-24 RX ORDER — ROSUVASTATIN CALCIUM 10 MG/1
1 TABLET, COATED ORAL NIGHTLY
COMMUNITY
Start: 2022-07-27 | End: 2023-07-18

## 2023-05-31 RX ORDER — AMLODIPINE BESYLATE 10 MG/1
TABLET ORAL
Qty: 90 TABLET | Refills: 0 | OUTPATIENT
Start: 2023-05-31

## 2023-06-05 RX ORDER — LISINOPRIL 40 MG/1
TABLET ORAL
Qty: 30 TABLET | Refills: 0 | Status: SHIPPED | OUTPATIENT
Start: 2023-06-05

## 2023-06-05 RX ORDER — AMLODIPINE BESYLATE 10 MG/1
TABLET ORAL
Qty: 30 TABLET | Refills: 0 | Status: SHIPPED | OUTPATIENT
Start: 2023-06-05

## 2023-07-10 RX ORDER — LISINOPRIL 40 MG/1
TABLET ORAL
Qty: 30 TABLET | Refills: 0 | OUTPATIENT
Start: 2023-07-10

## 2023-07-10 RX ORDER — AMLODIPINE BESYLATE 10 MG/1
TABLET ORAL
Qty: 30 TABLET | Refills: 0 | OUTPATIENT
Start: 2023-07-10

## 2023-07-17 RX ORDER — ROSUVASTATIN CALCIUM 10 MG/1
TABLET, COATED ORAL NIGHTLY
Qty: 30 TABLET | Refills: 0 | OUTPATIENT
Start: 2023-07-17

## 2023-07-18 DIAGNOSIS — Z82.49 FAMILY HISTORY OF ISCHEMIC HEART DISEASE AND OTHER DISEASES OF THE CIRCULATORY SYSTEM: Primary | ICD-10-CM

## 2023-07-18 RX ORDER — ROSUVASTATIN CALCIUM 10 MG/1
TABLET, COATED ORAL NIGHTLY
Qty: 15 TABLET | Refills: 0 | Status: SHIPPED | OUTPATIENT
Start: 2023-07-18

## 2023-07-18 NOTE — TELEPHONE ENCOUNTER
Requested Prescriptions     Signed Prescriptions Disp Refills    rosuvastatin (CRESTOR) 10 MG tablet 15 tablet 0     Sig: Take 1 tablet by mouth nightly     Authorizing Provider: Luh Marrufo     Ordering User: Michael Beckman    Per Dr. Sharif East verbal order. Note made \"need to request future refills from pcp\" per Dr. Sharif East last office note.

## 2023-08-05 DIAGNOSIS — Z82.49 FAMILY HISTORY OF ISCHEMIC HEART DISEASE AND OTHER DISEASES OF THE CIRCULATORY SYSTEM: ICD-10-CM

## 2023-08-07 RX ORDER — ROSUVASTATIN CALCIUM 10 MG/1
TABLET, COATED ORAL NIGHTLY
Qty: 15 TABLET | Refills: 0 | OUTPATIENT
Start: 2023-08-07

## 2023-08-11 DIAGNOSIS — Z82.49 FAMILY HISTORY OF ISCHEMIC HEART DISEASE AND OTHER DISEASES OF THE CIRCULATORY SYSTEM: ICD-10-CM

## 2023-08-11 RX ORDER — ROSUVASTATIN CALCIUM 10 MG/1
TABLET, COATED ORAL NIGHTLY
Qty: 15 TABLET | Refills: 0 | OUTPATIENT
Start: 2023-08-11